# Patient Record
Sex: FEMALE | Race: WHITE | NOT HISPANIC OR LATINO
[De-identification: names, ages, dates, MRNs, and addresses within clinical notes are randomized per-mention and may not be internally consistent; named-entity substitution may affect disease eponyms.]

---

## 2017-09-19 PROBLEM — Z00.00 ENCOUNTER FOR PREVENTIVE HEALTH EXAMINATION: Status: ACTIVE | Noted: 2017-09-19

## 2020-02-13 ENCOUNTER — APPOINTMENT (OUTPATIENT)
Dept: RHEUMATOLOGY | Facility: CLINIC | Age: 75
End: 2020-02-13

## 2021-10-14 ENCOUNTER — APPOINTMENT (OUTPATIENT)
Dept: CT IMAGING | Facility: CLINIC | Age: 76
End: 2021-10-14
Payer: MEDICARE

## 2021-10-14 ENCOUNTER — OUTPATIENT (OUTPATIENT)
Dept: OUTPATIENT SERVICES | Facility: HOSPITAL | Age: 76
LOS: 1 days | End: 2021-10-14
Payer: MEDICARE

## 2021-10-14 DIAGNOSIS — R06.00 DYSPNEA, UNSPECIFIED: ICD-10-CM

## 2021-10-14 DIAGNOSIS — R00.0 TACHYCARDIA, UNSPECIFIED: ICD-10-CM

## 2021-10-14 PROCEDURE — 82565 ASSAY OF CREATININE: CPT

## 2021-10-14 PROCEDURE — 75574 CT ANGIO HRT W/3D IMAGE: CPT

## 2021-10-14 PROCEDURE — 75574 CT ANGIO HRT W/3D IMAGE: CPT | Mod: 26

## 2022-07-16 ENCOUNTER — EMERGENCY (EMERGENCY)
Facility: HOSPITAL | Age: 77
LOS: 1 days | Discharge: HOME | End: 2022-07-16
Attending: EMERGENCY MEDICINE
Payer: MEDICARE

## 2022-07-16 VITALS
RESPIRATION RATE: 16 BRPM | TEMPERATURE: 98 F | HEART RATE: 77 BPM | DIASTOLIC BLOOD PRESSURE: 85 MMHG | SYSTOLIC BLOOD PRESSURE: 166 MMHG | OXYGEN SATURATION: 96 %

## 2022-07-16 VITALS
WEIGHT: 125 LBS | DIASTOLIC BLOOD PRESSURE: 84 MMHG | HEIGHT: 64 IN | OXYGEN SATURATION: 99 % | TEMPERATURE: 99 F | SYSTOLIC BLOOD PRESSURE: 168 MMHG | RESPIRATION RATE: 17 BRPM | HEART RATE: 83 BPM

## 2022-07-16 LAB
ALBUMIN SERPL ELPH-MCNC: 3.8 G/DL — SIGNIFICANT CHANGE UP (ref 3.5–5)
ALP SERPL-CCNC: 86 U/L — SIGNIFICANT CHANGE UP (ref 40–120)
ALT FLD-CCNC: 24 U/L DA — SIGNIFICANT CHANGE UP (ref 10–60)
ANION GAP SERPL CALC-SCNC: 9 MMOL/L — SIGNIFICANT CHANGE UP (ref 5–17)
AST SERPL-CCNC: 18 U/L — SIGNIFICANT CHANGE UP (ref 10–40)
BILIRUB SERPL-MCNC: 0.6 MG/DL — SIGNIFICANT CHANGE UP (ref 0.2–1.2)
BUN SERPL-MCNC: 18 MG/DL — SIGNIFICANT CHANGE UP (ref 7–18)
CALCIUM SERPL-MCNC: 10.8 MG/DL — HIGH (ref 8.4–10.5)
CHLORIDE SERPL-SCNC: 99 MMOL/L — SIGNIFICANT CHANGE UP (ref 96–108)
CO2 SERPL-SCNC: 24 MMOL/L — SIGNIFICANT CHANGE UP (ref 22–31)
CREAT SERPL-MCNC: 0.77 MG/DL — SIGNIFICANT CHANGE UP (ref 0.5–1.3)
EGFR: 78 ML/MIN/1.73M2 — SIGNIFICANT CHANGE UP
GLUCOSE SERPL-MCNC: 101 MG/DL — HIGH (ref 70–99)
HCT VFR BLD CALC: 38.2 % — SIGNIFICANT CHANGE UP (ref 34.5–45)
HGB BLD-MCNC: 12.4 G/DL — SIGNIFICANT CHANGE UP (ref 11.5–15.5)
MCHC RBC-ENTMCNC: 27.6 PG — SIGNIFICANT CHANGE UP (ref 27–34)
MCHC RBC-ENTMCNC: 32.5 GM/DL — SIGNIFICANT CHANGE UP (ref 32–36)
MCV RBC AUTO: 85.1 FL — SIGNIFICANT CHANGE UP (ref 80–100)
NRBC # BLD: 0 /100 WBCS — SIGNIFICANT CHANGE UP (ref 0–0)
PLATELET # BLD AUTO: 379 K/UL — SIGNIFICANT CHANGE UP (ref 150–400)
POTASSIUM SERPL-MCNC: 4 MMOL/L — SIGNIFICANT CHANGE UP (ref 3.5–5.3)
POTASSIUM SERPL-SCNC: 4 MMOL/L — SIGNIFICANT CHANGE UP (ref 3.5–5.3)
PROT SERPL-MCNC: 8 G/DL — SIGNIFICANT CHANGE UP (ref 6–8.3)
RBC # BLD: 4.49 M/UL — SIGNIFICANT CHANGE UP (ref 3.8–5.2)
RBC # FLD: 12.6 % — SIGNIFICANT CHANGE UP (ref 10.3–14.5)
SODIUM SERPL-SCNC: 132 MMOL/L — LOW (ref 135–145)
WBC # BLD: 11.15 K/UL — HIGH (ref 3.8–10.5)
WBC # FLD AUTO: 11.15 K/UL — HIGH (ref 3.8–10.5)

## 2022-07-16 PROCEDURE — 85027 COMPLETE CBC AUTOMATED: CPT

## 2022-07-16 PROCEDURE — 93005 ELECTROCARDIOGRAM TRACING: CPT

## 2022-07-16 PROCEDURE — 99284 EMERGENCY DEPT VISIT MOD MDM: CPT | Mod: 25

## 2022-07-16 PROCEDURE — 96374 THER/PROPH/DIAG INJ IV PUSH: CPT

## 2022-07-16 PROCEDURE — 93010 ELECTROCARDIOGRAM REPORT: CPT

## 2022-07-16 PROCEDURE — 99284 EMERGENCY DEPT VISIT MOD MDM: CPT

## 2022-07-16 PROCEDURE — 36415 COLL VENOUS BLD VENIPUNCTURE: CPT

## 2022-07-16 PROCEDURE — 80053 COMPREHEN METABOLIC PANEL: CPT

## 2022-07-16 RX ORDER — SODIUM CHLORIDE 9 MG/ML
1000 INJECTION INTRAMUSCULAR; INTRAVENOUS; SUBCUTANEOUS ONCE
Refills: 0 | Status: COMPLETED | OUTPATIENT
Start: 2022-07-16 | End: 2022-07-16

## 2022-07-16 RX ORDER — ONDANSETRON 8 MG/1
1 TABLET, FILM COATED ORAL
Qty: 15 | Refills: 0
Start: 2022-07-16

## 2022-07-16 RX ORDER — MECLIZINE HCL 12.5 MG
1 TABLET ORAL
Qty: 15 | Refills: 0
Start: 2022-07-16

## 2022-07-16 RX ORDER — MECLIZINE HCL 12.5 MG
25 TABLET ORAL ONCE
Refills: 0 | Status: COMPLETED | OUTPATIENT
Start: 2022-07-16 | End: 2022-07-16

## 2022-07-16 RX ORDER — ONDANSETRON 8 MG/1
4 TABLET, FILM COATED ORAL ONCE
Refills: 0 | Status: COMPLETED | OUTPATIENT
Start: 2022-07-16 | End: 2022-07-16

## 2022-07-16 RX ADMIN — Medication 25 MILLIGRAM(S): at 16:42

## 2022-07-16 RX ADMIN — ONDANSETRON 4 MILLIGRAM(S): 8 TABLET, FILM COATED ORAL at 16:42

## 2022-07-16 RX ADMIN — SODIUM CHLORIDE 1000 MILLILITER(S): 9 INJECTION INTRAMUSCULAR; INTRAVENOUS; SUBCUTANEOUS at 16:00

## 2022-07-16 NOTE — ED PROVIDER NOTE - PATIENT PORTAL LINK FT
You can access the FollowMyHealth Patient Portal offered by [facility] by registering at the following website: [email or web address]. By joining [employer]’s FollowMyHealth portal, you will also be able to view your health information using other applications (apps) compatible with our system.

## 2022-07-16 NOTE — ED PROVIDER NOTE - NSFOLLOWUPCLINICS_GEN_ALL_ED_FT
[facility]  Neurology  [address] [place] [address]  Phone: [phone]  Fax: [phone]

## 2022-07-16 NOTE — ED PROVIDER NOTE - PROGRESS NOTE DETAILS
EKG - nsr, rate 68, Qtc 433, no ischemic changes labs - WBC 11  Feeling better  Ambulating  Repeat neuro exam wnl  Dc with supportive care and neuro fu  Discussed indications for patient return to ED. Patient understood.

## 2022-07-16 NOTE — ED PROVIDER NOTE - OBJECTIVE STATEMENT
78 yo F pmh of giant cell arteritis, HTN, presents with dizziness since yesterday, feels like room spinning, worse with head movements. Denies other acute complaints.

## 2022-07-16 NOTE — ED ADULT NURSE NOTE - PLAN OF CARE
Patient is Scheduled with DR. POOL on 9/9/21.    No Further Questions, Confirmation Letter Sent.  Thank you!    NEW PATIENT QUESTIONS     Who referred you to our clinic? SELF-REFERRED    What are your primary symptoms you would like to be addressed at your visit? ALLERGIES    Have you seen an ALLERGIST: Yes     If yes who did you see? Currently sees provider through [facility] in [place], looking at establishing with Dr. Pool     Have you seen DERMATOLOGY?: No     If yes who did you see?      Have you seen PULMONOLOGY?: No     If yes who did you see?      Did you have any breathing tests (where you sit in a phone shields type box or breathe into a machine in the office) ? No     Have you seen gastroenterologist ?: No     If yes who did you see?      Have you seen ENT?: No     If so who did you see?      Have you been seen in the Emergency Department/ Urgent care for this issue? No    Any outside provider listed above should be faxed a request for records unless records are in Care Everywhere. Were records requested?  No         Call bell/Explanation of exam/test

## 2022-08-12 NOTE — ED PROVIDER NOTE - CHIEF COMPLAINT
----- Message from Sarah Correa sent at 7/29/2022  8:49 AM EDT -----  Subject: Hospital Follow Up    QUESTIONS  What hospital was the Patient Discharged from? [facility], [facility].  Date of Discharge? 2022-07-28  Discharge Location? Home  Reason for hospitalization as patient stated? dizziness, weakness,   lightheaded, diagnosed with UTI. What question does the patient have, if applicable? None. Please call   vivienne Cain to schedule.   ---------------------------------------------------------------------------  --------------  CALL BACK INFO  What is the best way for the office to contact you? OK to leave message on   voicemail  Preferred Call Back Phone Number? [phone]  ---------------------------------------------------------------------------  --------------  SCRIPT ANSWERS  Relationship to Patient? Other  Representative Name? Payton  Additional information verified (besides Name and Date of Birth)? Address  (Patient requests to see provider urgently. )? No  (Has the patient been discharged from the hospital within 2 business days   AND does not have a Telephone Encounter  Follow Up From [address] [place] [address]   documented in [address]?)? Yes  (Patient needs follow up visit after hospital discharge) Book first   available appointment within 7 days OF DISCHARGE, if no appt, proceed to   book the next available time slot within 14 days OF DISCHARGE AND Send   Message to Provider. [address] [facility] Follow Up appointment cannot be booked   beyond 14 Days and should result in a Message to Provider. ?  Yes The patient is a 77y Female complaining of dizziness.

## 2023-10-25 ENCOUNTER — TRANSCRIPTION ENCOUNTER (OUTPATIENT)
Age: 78
End: 2023-10-25

## 2023-10-25 ENCOUNTER — INPATIENT (INPATIENT)
Facility: HOSPITAL | Age: 78
LOS: 0 days | Discharge: HOME HEALTH | DRG: 563 | End: 2023-10-26
Attending: ORTHOPAEDIC SURGERY | Admitting: ORTHOPAEDIC SURGERY
Payer: MEDICARE

## 2023-10-25 VITALS
TEMPERATURE: 98 F | SYSTOLIC BLOOD PRESSURE: 177 MMHG | WEIGHT: 123.9 LBS | RESPIRATION RATE: 18 BRPM | HEART RATE: 85 BPM | HEIGHT: 63 IN | DIASTOLIC BLOOD PRESSURE: 81 MMHG | OXYGEN SATURATION: 97 %

## 2023-10-25 LAB
ALBUMIN SERPL ELPH-MCNC: 3.9 G/DL — SIGNIFICANT CHANGE UP (ref 3.5–5)
ALBUMIN SERPL ELPH-MCNC: 3.9 G/DL — SIGNIFICANT CHANGE UP (ref 3.5–5)
ALP SERPL-CCNC: 70 U/L — SIGNIFICANT CHANGE UP (ref 40–120)
ALP SERPL-CCNC: 70 U/L — SIGNIFICANT CHANGE UP (ref 40–120)
ALT FLD-CCNC: 37 U/L DA — SIGNIFICANT CHANGE UP (ref 10–60)
ALT FLD-CCNC: 37 U/L DA — SIGNIFICANT CHANGE UP (ref 10–60)
ANION GAP SERPL CALC-SCNC: 5 MMOL/L — SIGNIFICANT CHANGE UP (ref 5–17)
ANION GAP SERPL CALC-SCNC: 5 MMOL/L — SIGNIFICANT CHANGE UP (ref 5–17)
APTT BLD: 31 SEC — SIGNIFICANT CHANGE UP (ref 24.5–35.6)
APTT BLD: 31 SEC — SIGNIFICANT CHANGE UP (ref 24.5–35.6)
AST SERPL-CCNC: 30 U/L — SIGNIFICANT CHANGE UP (ref 10–40)
AST SERPL-CCNC: 30 U/L — SIGNIFICANT CHANGE UP (ref 10–40)
BASOPHILS # BLD AUTO: 0.03 K/UL — SIGNIFICANT CHANGE UP (ref 0–0.2)
BASOPHILS # BLD AUTO: 0.03 K/UL — SIGNIFICANT CHANGE UP (ref 0–0.2)
BASOPHILS NFR BLD AUTO: 0.2 % — SIGNIFICANT CHANGE UP (ref 0–2)
BASOPHILS NFR BLD AUTO: 0.2 % — SIGNIFICANT CHANGE UP (ref 0–2)
BILIRUB SERPL-MCNC: 0.4 MG/DL — SIGNIFICANT CHANGE UP (ref 0.2–1.2)
BILIRUB SERPL-MCNC: 0.4 MG/DL — SIGNIFICANT CHANGE UP (ref 0.2–1.2)
BLD GP AB SCN SERPL QL: SIGNIFICANT CHANGE UP
BLD GP AB SCN SERPL QL: SIGNIFICANT CHANGE UP
BUN SERPL-MCNC: 23 MG/DL — HIGH (ref 7–18)
BUN SERPL-MCNC: 23 MG/DL — HIGH (ref 7–18)
CALCIUM SERPL-MCNC: 9.7 MG/DL — SIGNIFICANT CHANGE UP (ref 8.4–10.5)
CALCIUM SERPL-MCNC: 9.7 MG/DL — SIGNIFICANT CHANGE UP (ref 8.4–10.5)
CHLORIDE SERPL-SCNC: 101 MMOL/L — SIGNIFICANT CHANGE UP (ref 96–108)
CHLORIDE SERPL-SCNC: 101 MMOL/L — SIGNIFICANT CHANGE UP (ref 96–108)
CO2 SERPL-SCNC: 28 MMOL/L — SIGNIFICANT CHANGE UP (ref 22–31)
CO2 SERPL-SCNC: 28 MMOL/L — SIGNIFICANT CHANGE UP (ref 22–31)
CREAT SERPL-MCNC: 1.04 MG/DL — SIGNIFICANT CHANGE UP (ref 0.5–1.3)
CREAT SERPL-MCNC: 1.04 MG/DL — SIGNIFICANT CHANGE UP (ref 0.5–1.3)
EGFR: 54 ML/MIN/1.73M2 — LOW
EGFR: 54 ML/MIN/1.73M2 — LOW
EOSINOPHIL # BLD AUTO: 0 K/UL — SIGNIFICANT CHANGE UP (ref 0–0.5)
EOSINOPHIL # BLD AUTO: 0 K/UL — SIGNIFICANT CHANGE UP (ref 0–0.5)
EOSINOPHIL NFR BLD AUTO: 0 % — SIGNIFICANT CHANGE UP (ref 0–6)
EOSINOPHIL NFR BLD AUTO: 0 % — SIGNIFICANT CHANGE UP (ref 0–6)
GLUCOSE SERPL-MCNC: 130 MG/DL — HIGH (ref 70–99)
GLUCOSE SERPL-MCNC: 130 MG/DL — HIGH (ref 70–99)
HCT VFR BLD CALC: 34.7 % — SIGNIFICANT CHANGE UP (ref 34.5–45)
HCT VFR BLD CALC: 34.7 % — SIGNIFICANT CHANGE UP (ref 34.5–45)
HGB BLD-MCNC: 11.1 G/DL — LOW (ref 11.5–15.5)
HGB BLD-MCNC: 11.1 G/DL — LOW (ref 11.5–15.5)
IMM GRANULOCYTES NFR BLD AUTO: 0.4 % — SIGNIFICANT CHANGE UP (ref 0–0.9)
IMM GRANULOCYTES NFR BLD AUTO: 0.4 % — SIGNIFICANT CHANGE UP (ref 0–0.9)
INR BLD: 1.03 RATIO — SIGNIFICANT CHANGE UP (ref 0.85–1.18)
INR BLD: 1.03 RATIO — SIGNIFICANT CHANGE UP (ref 0.85–1.18)
LYMPHOCYTES # BLD AUTO: 1.07 K/UL — SIGNIFICANT CHANGE UP (ref 1–3.3)
LYMPHOCYTES # BLD AUTO: 1.07 K/UL — SIGNIFICANT CHANGE UP (ref 1–3.3)
LYMPHOCYTES # BLD AUTO: 6.8 % — LOW (ref 13–44)
LYMPHOCYTES # BLD AUTO: 6.8 % — LOW (ref 13–44)
MCHC RBC-ENTMCNC: 27.5 PG — SIGNIFICANT CHANGE UP (ref 27–34)
MCHC RBC-ENTMCNC: 27.5 PG — SIGNIFICANT CHANGE UP (ref 27–34)
MCHC RBC-ENTMCNC: 32 GM/DL — SIGNIFICANT CHANGE UP (ref 32–36)
MCHC RBC-ENTMCNC: 32 GM/DL — SIGNIFICANT CHANGE UP (ref 32–36)
MCV RBC AUTO: 86.1 FL — SIGNIFICANT CHANGE UP (ref 80–100)
MCV RBC AUTO: 86.1 FL — SIGNIFICANT CHANGE UP (ref 80–100)
MONOCYTES # BLD AUTO: 0.69 K/UL — SIGNIFICANT CHANGE UP (ref 0–0.9)
MONOCYTES # BLD AUTO: 0.69 K/UL — SIGNIFICANT CHANGE UP (ref 0–0.9)
MONOCYTES NFR BLD AUTO: 4.4 % — SIGNIFICANT CHANGE UP (ref 2–14)
MONOCYTES NFR BLD AUTO: 4.4 % — SIGNIFICANT CHANGE UP (ref 2–14)
NEUTROPHILS # BLD AUTO: 13.99 K/UL — HIGH (ref 1.8–7.4)
NEUTROPHILS # BLD AUTO: 13.99 K/UL — HIGH (ref 1.8–7.4)
NEUTROPHILS NFR BLD AUTO: 88.2 % — HIGH (ref 43–77)
NEUTROPHILS NFR BLD AUTO: 88.2 % — HIGH (ref 43–77)
NRBC # BLD: 0 /100 WBCS — SIGNIFICANT CHANGE UP (ref 0–0)
NRBC # BLD: 0 /100 WBCS — SIGNIFICANT CHANGE UP (ref 0–0)
PLATELET # BLD AUTO: 284 K/UL — SIGNIFICANT CHANGE UP (ref 150–400)
PLATELET # BLD AUTO: 284 K/UL — SIGNIFICANT CHANGE UP (ref 150–400)
POTASSIUM SERPL-MCNC: 4.3 MMOL/L — SIGNIFICANT CHANGE UP (ref 3.5–5.3)
POTASSIUM SERPL-MCNC: 4.3 MMOL/L — SIGNIFICANT CHANGE UP (ref 3.5–5.3)
POTASSIUM SERPL-SCNC: 4.3 MMOL/L — SIGNIFICANT CHANGE UP (ref 3.5–5.3)
POTASSIUM SERPL-SCNC: 4.3 MMOL/L — SIGNIFICANT CHANGE UP (ref 3.5–5.3)
PROT SERPL-MCNC: 7.7 G/DL — SIGNIFICANT CHANGE UP (ref 6–8.3)
PROT SERPL-MCNC: 7.7 G/DL — SIGNIFICANT CHANGE UP (ref 6–8.3)
PROTHROM AB SERPL-ACNC: 11.7 SEC — SIGNIFICANT CHANGE UP (ref 9.5–13)
PROTHROM AB SERPL-ACNC: 11.7 SEC — SIGNIFICANT CHANGE UP (ref 9.5–13)
RBC # BLD: 4.03 M/UL — SIGNIFICANT CHANGE UP (ref 3.8–5.2)
RBC # BLD: 4.03 M/UL — SIGNIFICANT CHANGE UP (ref 3.8–5.2)
RBC # FLD: 14 % — SIGNIFICANT CHANGE UP (ref 10.3–14.5)
RBC # FLD: 14 % — SIGNIFICANT CHANGE UP (ref 10.3–14.5)
SODIUM SERPL-SCNC: 134 MMOL/L — LOW (ref 135–145)
SODIUM SERPL-SCNC: 134 MMOL/L — LOW (ref 135–145)
WBC # BLD: 15.84 K/UL — HIGH (ref 3.8–10.5)
WBC # BLD: 15.84 K/UL — HIGH (ref 3.8–10.5)
WBC # FLD AUTO: 15.84 K/UL — HIGH (ref 3.8–10.5)
WBC # FLD AUTO: 15.84 K/UL — HIGH (ref 3.8–10.5)

## 2023-10-25 PROCEDURE — 71101 X-RAY EXAM UNILAT RIBS/CHEST: CPT | Mod: 26

## 2023-10-25 PROCEDURE — 73030 X-RAY EXAM OF SHOULDER: CPT | Mod: 26,RT

## 2023-10-25 PROCEDURE — 73060 X-RAY EXAM OF HUMERUS: CPT | Mod: 26,RT

## 2023-10-25 PROCEDURE — 99285 EMERGENCY DEPT VISIT HI MDM: CPT

## 2023-10-25 PROCEDURE — 73030 X-RAY EXAM OF SHOULDER: CPT | Mod: 26,RT,77

## 2023-10-25 PROCEDURE — 73080 X-RAY EXAM OF ELBOW: CPT | Mod: 26,RT

## 2023-10-25 RX ORDER — LIDOCAINE HCL 20 MG/ML
10 VIAL (ML) INJECTION ONCE
Refills: 0 | Status: COMPLETED | OUTPATIENT
Start: 2023-10-25 | End: 2023-10-25

## 2023-10-25 RX ORDER — MORPHINE SULFATE 50 MG/1
2 CAPSULE, EXTENDED RELEASE ORAL ONCE
Refills: 0 | Status: DISCONTINUED | OUTPATIENT
Start: 2023-10-25 | End: 2023-10-25

## 2023-10-25 RX ORDER — SODIUM CHLORIDE 9 MG/ML
500 INJECTION INTRAMUSCULAR; INTRAVENOUS; SUBCUTANEOUS ONCE
Refills: 0 | Status: COMPLETED | OUTPATIENT
Start: 2023-10-25 | End: 2023-10-25

## 2023-10-25 RX ORDER — KETOROLAC TROMETHAMINE 30 MG/ML
15 SYRINGE (ML) INJECTION ONCE
Refills: 0 | Status: DISCONTINUED | OUTPATIENT
Start: 2023-10-25 | End: 2023-10-25

## 2023-10-25 RX ORDER — ONDANSETRON 8 MG/1
4 TABLET, FILM COATED ORAL ONCE
Refills: 0 | Status: COMPLETED | OUTPATIENT
Start: 2023-10-25 | End: 2023-10-25

## 2023-10-25 RX ADMIN — SODIUM CHLORIDE 500 MILLILITER(S): 9 INJECTION INTRAMUSCULAR; INTRAVENOUS; SUBCUTANEOUS at 23:41

## 2023-10-25 RX ADMIN — ONDANSETRON 4 MILLIGRAM(S): 8 TABLET, FILM COATED ORAL at 23:41

## 2023-10-25 RX ADMIN — MORPHINE SULFATE 2 MILLIGRAM(S): 50 CAPSULE, EXTENDED RELEASE ORAL at 22:41

## 2023-10-25 RX ADMIN — Medication 15 MILLIGRAM(S): at 20:10

## 2023-10-25 RX ADMIN — Medication 10 MILLILITER(S): at 23:25

## 2023-10-25 RX ADMIN — Medication 15 MILLIGRAM(S): at 22:44

## 2023-10-25 NOTE — ED PROVIDER NOTE - CARE PLAN
Principal Discharge DX:	Dislocation of right shoulder joint  Secondary Diagnosis:	Fracture of humerus greater tuberosity   1

## 2023-10-25 NOTE — ED PROVIDER NOTE - PHYSICAL EXAMINATION
Gen:  Awake, alert, NAD, WDWN, NCAT, non-toxic appearing. No skull/facial tender, no step-offs, no raccoon/spain.  Eyes:  PERRL, EOMI, no icterus, normal lids/lashes, normal conjunctivae.  ENT:  External inspection normal, pink/moist membranes. No dental pain/tender, no loose teeth, no malocclusion, no bite deformity, no septal hematoma, no sinus/tmj/dental/temporal/mastoid tender.  CV:  S1S2, regular rate and rhythm, no murmur/gallops/rubs, no JVD, 2+ pulses b/l, no edema/cords/homans, warm/well-perfused.  Resp:  Normal respiratory rate/effort, no respiratory distress, normal voice, speaking full sentences, lungs clear to auscultation b/l, no wheezing/rales/rhonchi, no retractions, no stridor.  Abd:  Soft abdomen, no tender/distended/guarding/rebound, no pulsatile mass, no CVA tender.   Musculoskeletal:  N/V intact, FROM all 4 extremities, normal motor tone, stable gait. Pelvis stable, no TLS spinal tender/deformity/step-offs, no darby tender/deformity. R shoulder/proximal humerus difficulty w ROM due to pain, no effusion/swelling, no deformity, +diffuse tender. R elbow/forearm FROM, stable joints, no effusion/swelling, no darby tender/deformity. Chest wall no tender, deformity, flail, or crepitus. R knee:  FROM/full extension, neg ant/post drawer, neg varus/valgus tender, neg grind, no effusion/swelling, no adrby tender/deformity.   Neck:  FROM neck, supple, trachea midline, no meningismus. No C-spine tender/deformity/step-offs.   Skin:  Color normal for race, warm and dry, no rash. R knee superficial abrasion w no evidence of infection  Neuro:  Oriented x3, CN 2-12 intact (grossly), normal motor (grossly), normal sensory (grossly), normal gait. GCS 15  Psych:  Attention normal. Affect normal. Behavior normal. Judgment normal.

## 2023-10-25 NOTE — ED PROVIDER NOTE - OBJECTIVE STATEMENT
78 female with hx of HTN, HLD.   Pt presenting to the ED reporting mechanical slip and fall with injury to right shoulder & chest wall.  No head/neck trauma, no LOC, no blood thinner use.  Patient usual state of health prior, no other injuries or complaints.

## 2023-10-25 NOTE — ED PROVIDER NOTE - NSFOLLOWUPINSTRUCTIONS_ED_ALL_ED_FT
Lab values demonstrate no acute/emergent pathology.    Independent interpretation of the EKG: Sinus @ ***, normal axis, normal intervals, normal ST/T  Independent interpretation of XR: ***    ***    Pt advised regarding need for close outpatient follow up.  Patient stable for further care in outpatient setting. No indication for inpatient admission at this time. Patient advised regarding symptomatic & supportive care and symptoms to prompt ED return. Strict return precautions provided.  ***  Patient requires inpatient admission for further care & stabilization. Care signed out to inpatient team.

## 2023-10-25 NOTE — ED ADULT NURSE NOTE - NSFALLRISKINTERV_ED_ALL_ED

## 2023-10-25 NOTE — ED PROVIDER NOTE - NS ED ROS FT
Constitutional: (-) fever (-) chills  HENT: (-) congestion (-) rhinorrhea (-) sore throat  Eyes: (-) pain (-) redness  Respiratory: (-) cough (-) shortness of breath (-) wheezing (-) stridor    Cardiovascular: (-) palpitations (-) leg swelling  Gastrointestinal: (-) abdominal pain (-) blood in stool (no melena/hematochezia) (-) diarrhea (-) vomiting  Genitourinary: (-) dysuria (-) hematuria  Musculoskeletal: (-) gait problem (+) R shoulder pain  Skin: (-) color change (-) rash  Neurological: (-) weakness (-) numbness (-) headaches  Psychiatric/Behavioral: (-) confusion

## 2023-10-25 NOTE — ED PROVIDER NOTE - WR ORDER ID 3
Detail Level: Zone
Plan: Will resume Soolantra through [facility]
Continue Regimen: Soolantra 1% cream daily
[id number]

## 2023-10-25 NOTE — ED PROVIDER NOTE - PROGRESS NOTE DETAILS
Berenice Martinez
Attempted reduction w intra-articular lidocaine unsuccessful.  Conscious sedation performed for R shoulder reduction. Unable to reduce extremity.   Care d/w PA Neri of Orthopedics and Pt to be admitted for OR reduction  Care d/w Dr Leiva for medical clearance.    Pt currently awake/alert and talking. Notified of need for admit and comfortable w plan.

## 2023-10-25 NOTE — ED PROVIDER NOTE - CLINICAL SUMMARY MEDICAL DECISION MAKING FREE TEXT BOX
78 female with HTN & HLD presenting to the ED with mechanical fall with right shoulder & chest wall injury.    Vitals with elevated BP    Nontoxic appearing, n/v intact.  Airway intact, no respiratory distress, no hypoxia.  No abdominal or CVA tenderness.  Nonfocal neuro.  No head/neck trauma, no LOC, no blood thinner use.    Plan to obtain:    -XR R/O fracture, analgesia as needed, observe/reassess      No trauma. No blood thinner use. No new exercise/strain.    No recent travel, hospitalization, or immobilization. No recent abx or sick contacts. No new or suspected bad foods. No new medications, shampoo, detergent, or creams. No known bug bites. No new exposures. No new herbals or supplements. 78 female with HTN & HLD presenting to the ED with mechanical fall with right shoulder & chest wall injury.    Vitals with elevated BP    Nontoxic appearing, n/v intact.  Airway intact, no respiratory distress, no hypoxia.  No abdominal or CVA tenderness.  Nonfocal neuro.  No head/neck trauma, no LOC, no blood thinner use.    Plan to obtain:    -XR R/O fracture, analgesia as needed, observe/reassess    Lab values demonstrate no acute/emergent pathology.  Independent interpretation of RUE XR: anterior shoulder dislocation w fx of greater tuberosity.  Independent interpretation of CXR: No consolidation/effusion/pntx, no rib fx.  Repeat XR after initial attempted reduction w intra-articular lidocaine w unchanged reduction    Patient requires inpatient admission for further care & stabilization. Care signed out to inpatient team.

## 2023-10-26 ENCOUNTER — TRANSCRIPTION ENCOUNTER (OUTPATIENT)
Age: 78
End: 2023-10-26

## 2023-10-26 VITALS
SYSTOLIC BLOOD PRESSURE: 132 MMHG | HEART RATE: 79 BPM | RESPIRATION RATE: 16 BRPM | DIASTOLIC BLOOD PRESSURE: 71 MMHG | TEMPERATURE: 98 F | OXYGEN SATURATION: 97 %

## 2023-10-26 DIAGNOSIS — D72.829 ELEVATED WHITE BLOOD CELL COUNT, UNSPECIFIED: ICD-10-CM

## 2023-10-26 DIAGNOSIS — I10 ESSENTIAL (PRIMARY) HYPERTENSION: ICD-10-CM

## 2023-10-26 DIAGNOSIS — E78.5 HYPERLIPIDEMIA, UNSPECIFIED: ICD-10-CM

## 2023-10-26 DIAGNOSIS — Z29.9 ENCOUNTER FOR PROPHYLACTIC MEASURES, UNSPECIFIED: ICD-10-CM

## 2023-10-26 DIAGNOSIS — S43.004A UNSPECIFIED DISLOCATION OF RIGHT SHOULDER JOINT, INITIAL ENCOUNTER: ICD-10-CM

## 2023-10-26 DIAGNOSIS — Z86.79 PERSONAL HISTORY OF OTHER DISEASES OF THE CIRCULATORY SYSTEM: ICD-10-CM

## 2023-10-26 DIAGNOSIS — S43.014A ANTERIOR DISLOCATION OF RIGHT HUMERUS, INITIAL ENCOUNTER: ICD-10-CM

## 2023-10-26 LAB
ALBUMIN SERPL ELPH-MCNC: 3.4 G/DL — LOW (ref 3.5–5)
ALBUMIN SERPL ELPH-MCNC: 3.4 G/DL — LOW (ref 3.5–5)
ALP SERPL-CCNC: 54 U/L — SIGNIFICANT CHANGE UP (ref 40–120)
ALP SERPL-CCNC: 54 U/L — SIGNIFICANT CHANGE UP (ref 40–120)
ALT FLD-CCNC: 37 U/L DA — SIGNIFICANT CHANGE UP (ref 10–60)
ALT FLD-CCNC: 37 U/L DA — SIGNIFICANT CHANGE UP (ref 10–60)
ANION GAP SERPL CALC-SCNC: 4 MMOL/L — LOW (ref 5–17)
ANION GAP SERPL CALC-SCNC: 4 MMOL/L — LOW (ref 5–17)
AST SERPL-CCNC: 36 U/L — SIGNIFICANT CHANGE UP (ref 10–40)
AST SERPL-CCNC: 36 U/L — SIGNIFICANT CHANGE UP (ref 10–40)
BILIRUB SERPL-MCNC: 0.5 MG/DL — SIGNIFICANT CHANGE UP (ref 0.2–1.2)
BILIRUB SERPL-MCNC: 0.5 MG/DL — SIGNIFICANT CHANGE UP (ref 0.2–1.2)
BUN SERPL-MCNC: 16 MG/DL — SIGNIFICANT CHANGE UP (ref 7–18)
BUN SERPL-MCNC: 16 MG/DL — SIGNIFICANT CHANGE UP (ref 7–18)
CALCIUM SERPL-MCNC: 9.6 MG/DL — SIGNIFICANT CHANGE UP (ref 8.4–10.5)
CALCIUM SERPL-MCNC: 9.6 MG/DL — SIGNIFICANT CHANGE UP (ref 8.4–10.5)
CHLORIDE SERPL-SCNC: 103 MMOL/L — SIGNIFICANT CHANGE UP (ref 96–108)
CHLORIDE SERPL-SCNC: 103 MMOL/L — SIGNIFICANT CHANGE UP (ref 96–108)
CO2 SERPL-SCNC: 25 MMOL/L — SIGNIFICANT CHANGE UP (ref 22–31)
CO2 SERPL-SCNC: 25 MMOL/L — SIGNIFICANT CHANGE UP (ref 22–31)
CREAT SERPL-MCNC: 0.83 MG/DL — SIGNIFICANT CHANGE UP (ref 0.5–1.3)
CREAT SERPL-MCNC: 0.83 MG/DL — SIGNIFICANT CHANGE UP (ref 0.5–1.3)
EGFR: 71 ML/MIN/1.73M2 — SIGNIFICANT CHANGE UP
EGFR: 71 ML/MIN/1.73M2 — SIGNIFICANT CHANGE UP
GLUCOSE SERPL-MCNC: 127 MG/DL — HIGH (ref 70–99)
GLUCOSE SERPL-MCNC: 127 MG/DL — HIGH (ref 70–99)
HCT VFR BLD CALC: 33.1 % — LOW (ref 34.5–45)
HCT VFR BLD CALC: 33.1 % — LOW (ref 34.5–45)
HGB BLD-MCNC: 10.6 G/DL — LOW (ref 11.5–15.5)
HGB BLD-MCNC: 10.6 G/DL — LOW (ref 11.5–15.5)
MAGNESIUM SERPL-MCNC: 2.1 MG/DL — SIGNIFICANT CHANGE UP (ref 1.6–2.6)
MAGNESIUM SERPL-MCNC: 2.1 MG/DL — SIGNIFICANT CHANGE UP (ref 1.6–2.6)
MCHC RBC-ENTMCNC: 27.7 PG — SIGNIFICANT CHANGE UP (ref 27–34)
MCHC RBC-ENTMCNC: 27.7 PG — SIGNIFICANT CHANGE UP (ref 27–34)
MCHC RBC-ENTMCNC: 32 GM/DL — SIGNIFICANT CHANGE UP (ref 32–36)
MCHC RBC-ENTMCNC: 32 GM/DL — SIGNIFICANT CHANGE UP (ref 32–36)
MCV RBC AUTO: 86.6 FL — SIGNIFICANT CHANGE UP (ref 80–100)
MCV RBC AUTO: 86.6 FL — SIGNIFICANT CHANGE UP (ref 80–100)
NRBC # BLD: 0 /100 WBCS — SIGNIFICANT CHANGE UP (ref 0–0)
NRBC # BLD: 0 /100 WBCS — SIGNIFICANT CHANGE UP (ref 0–0)
PHOSPHATE SERPL-MCNC: 3.5 MG/DL — SIGNIFICANT CHANGE UP (ref 2.5–4.5)
PHOSPHATE SERPL-MCNC: 3.5 MG/DL — SIGNIFICANT CHANGE UP (ref 2.5–4.5)
PLATELET # BLD AUTO: 287 K/UL — SIGNIFICANT CHANGE UP (ref 150–400)
PLATELET # BLD AUTO: 287 K/UL — SIGNIFICANT CHANGE UP (ref 150–400)
POTASSIUM SERPL-MCNC: 4.3 MMOL/L — SIGNIFICANT CHANGE UP (ref 3.5–5.3)
POTASSIUM SERPL-MCNC: 4.3 MMOL/L — SIGNIFICANT CHANGE UP (ref 3.5–5.3)
POTASSIUM SERPL-SCNC: 4.3 MMOL/L — SIGNIFICANT CHANGE UP (ref 3.5–5.3)
POTASSIUM SERPL-SCNC: 4.3 MMOL/L — SIGNIFICANT CHANGE UP (ref 3.5–5.3)
PROT SERPL-MCNC: 7.1 G/DL — SIGNIFICANT CHANGE UP (ref 6–8.3)
PROT SERPL-MCNC: 7.1 G/DL — SIGNIFICANT CHANGE UP (ref 6–8.3)
RBC # BLD: 3.82 M/UL — SIGNIFICANT CHANGE UP (ref 3.8–5.2)
RBC # BLD: 3.82 M/UL — SIGNIFICANT CHANGE UP (ref 3.8–5.2)
RBC # FLD: 14.1 % — SIGNIFICANT CHANGE UP (ref 10.3–14.5)
RBC # FLD: 14.1 % — SIGNIFICANT CHANGE UP (ref 10.3–14.5)
SODIUM SERPL-SCNC: 132 MMOL/L — LOW (ref 135–145)
SODIUM SERPL-SCNC: 132 MMOL/L — LOW (ref 135–145)
WBC # BLD: 14.68 K/UL — HIGH (ref 3.8–10.5)
WBC # BLD: 14.68 K/UL — HIGH (ref 3.8–10.5)
WBC # FLD AUTO: 14.68 K/UL — HIGH (ref 3.8–10.5)
WBC # FLD AUTO: 14.68 K/UL — HIGH (ref 3.8–10.5)

## 2023-10-26 PROCEDURE — 76000 FLUOROSCOPY <1 HR PHYS/QHP: CPT

## 2023-10-26 PROCEDURE — 71045 X-RAY EXAM CHEST 1 VIEW: CPT | Mod: 26

## 2023-10-26 PROCEDURE — 71045 X-RAY EXAM CHEST 1 VIEW: CPT

## 2023-10-26 PROCEDURE — 36415 COLL VENOUS BLD VENIPUNCTURE: CPT

## 2023-10-26 PROCEDURE — 80053 COMPREHEN METABOLIC PANEL: CPT

## 2023-10-26 PROCEDURE — 86850 RBC ANTIBODY SCREEN: CPT

## 2023-10-26 PROCEDURE — 73060 X-RAY EXAM OF HUMERUS: CPT

## 2023-10-26 PROCEDURE — 84100 ASSAY OF PHOSPHORUS: CPT

## 2023-10-26 PROCEDURE — 73030 X-RAY EXAM OF SHOULDER: CPT | Mod: 26,RT

## 2023-10-26 PROCEDURE — 85027 COMPLETE CBC AUTOMATED: CPT

## 2023-10-26 PROCEDURE — 99222 1ST HOSP IP/OBS MODERATE 55: CPT

## 2023-10-26 PROCEDURE — 93306 TTE W/DOPPLER COMPLETE: CPT

## 2023-10-26 PROCEDURE — 85025 COMPLETE CBC W/AUTO DIFF WBC: CPT

## 2023-10-26 PROCEDURE — 73030 X-RAY EXAM OF SHOULDER: CPT

## 2023-10-26 PROCEDURE — 99285 EMERGENCY DEPT VISIT HI MDM: CPT

## 2023-10-26 PROCEDURE — 85610 PROTHROMBIN TIME: CPT

## 2023-10-26 PROCEDURE — 86900 BLOOD TYPING SEROLOGIC ABO: CPT

## 2023-10-26 PROCEDURE — 82962 GLUCOSE BLOOD TEST: CPT

## 2023-10-26 PROCEDURE — 86901 BLOOD TYPING SEROLOGIC RH(D): CPT

## 2023-10-26 PROCEDURE — 71101 X-RAY EXAM UNILAT RIBS/CHEST: CPT

## 2023-10-26 PROCEDURE — 83735 ASSAY OF MAGNESIUM: CPT

## 2023-10-26 PROCEDURE — 73080 X-RAY EXAM OF ELBOW: CPT

## 2023-10-26 PROCEDURE — 85730 THROMBOPLASTIN TIME PARTIAL: CPT

## 2023-10-26 RX ORDER — ACETAMINOPHEN 500 MG
650 TABLET ORAL EVERY 6 HOURS
Refills: 0 | Status: DISCONTINUED | OUTPATIENT
Start: 2023-10-26 | End: 2023-10-26

## 2023-10-26 RX ORDER — SODIUM CHLORIDE 9 MG/ML
1000 INJECTION, SOLUTION INTRAVENOUS
Refills: 0 | Status: DISCONTINUED | OUTPATIENT
Start: 2023-10-26 | End: 2023-10-26

## 2023-10-26 RX ORDER — IBUPROFEN 200 MG
1 TABLET ORAL
Qty: 28 | Refills: 0
Start: 2023-10-26 | End: 2023-11-01

## 2023-10-26 RX ORDER — METOPROLOL TARTRATE 50 MG
25 TABLET ORAL
Refills: 0 | Status: DISCONTINUED | OUTPATIENT
Start: 2023-10-26 | End: 2023-10-26

## 2023-10-26 RX ORDER — SODIUM CHLORIDE 9 MG/ML
1000 INJECTION INTRAMUSCULAR; INTRAVENOUS; SUBCUTANEOUS ONCE
Refills: 0 | Status: COMPLETED | OUTPATIENT
Start: 2023-10-26 | End: 2023-10-26

## 2023-10-26 RX ORDER — AMLODIPINE BESYLATE 2.5 MG/1
2.5 TABLET ORAL DAILY
Refills: 0 | Status: DISCONTINUED | OUTPATIENT
Start: 2023-10-26 | End: 2023-10-26

## 2023-10-26 RX ORDER — OXYCODONE AND ACETAMINOPHEN 5; 325 MG/1; MG/1
1 TABLET ORAL
Qty: 28 | Refills: 0
Start: 2023-10-26 | End: 2023-11-01

## 2023-10-26 RX ORDER — METOPROLOL TARTRATE 50 MG
1 TABLET ORAL
Refills: 0 | DISCHARGE

## 2023-10-26 RX ORDER — FENTANYL CITRATE 50 UG/ML
25 INJECTION INTRAVENOUS
Refills: 0 | Status: DISCONTINUED | OUTPATIENT
Start: 2023-10-26 | End: 2023-10-26

## 2023-10-26 RX ORDER — ONDANSETRON 8 MG/1
4 TABLET, FILM COATED ORAL EVERY 6 HOURS
Refills: 0 | Status: DISCONTINUED | OUTPATIENT
Start: 2023-10-26 | End: 2023-10-26

## 2023-10-26 RX ORDER — SENNA PLUS 8.6 MG/1
2 TABLET ORAL
Qty: 14 | Refills: 0
Start: 2023-10-26 | End: 2023-11-01

## 2023-10-26 RX ORDER — PROPOFOL 10 MG/ML
50 INJECTION, EMULSION INTRAVENOUS ONCE
Refills: 0 | Status: COMPLETED | OUTPATIENT
Start: 2023-10-26 | End: 2023-10-26

## 2023-10-26 RX ORDER — AMLODIPINE BESYLATE 2.5 MG/1
1 TABLET ORAL
Refills: 0 | DISCHARGE

## 2023-10-26 RX ORDER — OXYCODONE HYDROCHLORIDE 5 MG/1
5 TABLET ORAL EVERY 6 HOURS
Refills: 0 | Status: DISCONTINUED | OUTPATIENT
Start: 2023-10-26 | End: 2023-10-26

## 2023-10-26 RX ORDER — KETAMINE HYDROCHLORIDE 100 MG/ML
50 INJECTION INTRAMUSCULAR; INTRAVENOUS ONCE
Refills: 0 | Status: DISCONTINUED | OUTPATIENT
Start: 2023-10-26 | End: 2023-10-26

## 2023-10-26 RX ORDER — ROSUVASTATIN CALCIUM 5 MG/1
1 TABLET ORAL
Refills: 0 | DISCHARGE

## 2023-10-26 RX ADMIN — SODIUM CHLORIDE 1000 MILLILITER(S): 9 INJECTION INTRAMUSCULAR; INTRAVENOUS; SUBCUTANEOUS at 00:33

## 2023-10-26 RX ADMIN — OXYCODONE HYDROCHLORIDE 5 MILLIGRAM(S): 5 TABLET ORAL at 11:26

## 2023-10-26 RX ADMIN — SODIUM CHLORIDE 500 MILLILITER(S): 9 INJECTION INTRAMUSCULAR; INTRAVENOUS; SUBCUTANEOUS at 02:39

## 2023-10-26 RX ADMIN — AMLODIPINE BESYLATE 2.5 MILLIGRAM(S): 2.5 TABLET ORAL at 05:23

## 2023-10-26 RX ADMIN — OXYCODONE HYDROCHLORIDE 5 MILLIGRAM(S): 5 TABLET ORAL at 12:26

## 2023-10-26 RX ADMIN — Medication 650 MILLIGRAM(S): at 10:58

## 2023-10-26 RX ADMIN — SODIUM CHLORIDE 1000 MILLILITER(S): 9 INJECTION INTRAMUSCULAR; INTRAVENOUS; SUBCUTANEOUS at 02:39

## 2023-10-26 RX ADMIN — MORPHINE SULFATE 2 MILLIGRAM(S): 50 CAPSULE, EXTENDED RELEASE ORAL at 02:00

## 2023-10-26 RX ADMIN — Medication 25 MILLIGRAM(S): at 05:23

## 2023-10-26 RX ADMIN — Medication 650 MILLIGRAM(S): at 10:28

## 2023-10-26 RX ADMIN — SODIUM CHLORIDE 70 MILLILITER(S): 9 INJECTION, SOLUTION INTRAVENOUS at 03:45

## 2023-10-26 RX ADMIN — PROPOFOL 50 MILLIGRAM(S): 10 INJECTION, EMULSION INTRAVENOUS at 00:34

## 2023-10-26 RX ADMIN — SODIUM CHLORIDE 70 MILLILITER(S): 9 INJECTION, SOLUTION INTRAVENOUS at 08:38

## 2023-10-26 NOTE — H&P ADULT - NSHPREVIEWOFSYSTEMS_GEN_ALL_CORE
Pt denies Chest pain, SOB, dyspnea, paresthesias, N/V/D, abdominal pain, syncope, or pain anywhere else.

## 2023-10-26 NOTE — H&P ADULT - NSHPPHYSICALEXAM_GEN_ALL_CORE
General: AAOx3. Nad  HEENT: PERRLA, EOMI  Neck: No JVD, neck supple, Trachea midline  CVS: S1/S1 present, RRR, no MRG  Pulm: CTA b/l. No WRR  Abd: +BS, soft, ND NT  Ext: 2+ pulses. No edema. good cap refill SILT  Neuro: no focal neurodeficits. CNII-XII intact  MSK:   rt shoulder: squared off appearance, skin intactm dec ROM 2dary to pain/dislocation  NVI SILT, FROM of the rt elbow, rt wrist, rt fingers compartments soft, sling intact

## 2023-10-26 NOTE — ED ADULT NURSE REASSESSMENT NOTE - NS ED NURSE REASSESS COMMENT FT1
Pt resting in bed, calm, conscious sedation to begin shortly, MD Villanueva at bedside. No acute distress noted, denies chest pain no SOB noted.

## 2023-10-26 NOTE — CONSULT NOTE ADULT - SUBJECTIVE AND OBJECTIVE BOX
PGY-3 Internal Medicine Consult Note discussed with attending    PLEASE CONTACT ON CALL TEAM:  - On Call Team (Please refer to Brent) FROM 5:00 PM - 8:30PM  - Nightfloat Team FROM 8:30 -7:30 AM    CHIEF COMPLAINT & BRIEF HOSPITAL COURSE:    INTERVAL HPI/OVERNIGHT EVENTS:       REVIEW OF SYSTEMS:  CONSTITUTIONAL: No fever, weight loss, or fatigue  RESPIRATORY: No cough, wheezing, chills or hemoptysis; No shortness of breath  CARDIOVASCULAR: No chest pain, palpitations, dizziness, or leg swelling  GASTROINTESTINAL: No abdominal pain. No nausea, vomiting, or hematemesis; No diarrhea or constipation. No melena or hematochezia.  GENITOURINARY: No dysuria or hematuria, urinary frequency  NEUROLOGICAL: No headaches, memory loss, loss of strength, numbness, or tremors  SKIN: No itching, burning, rashes, or lesions     Vital Signs Last 24 Hrs  T(C): 36.6 (25 Oct 2023 19:01), Max: 36.6 (25 Oct 2023 19:01)  T(F): 97.9 (25 Oct 2023 19:01), Max: 97.9 (25 Oct 2023 19:01)  HR: 89 (26 Oct 2023 01:26) (69 - 89)  BP: 146/70 (26 Oct 2023 01:26) (117/74 - 177/81)  BP(mean): --  RR: 16 (26 Oct 2023 01:26) (14 - 19)  SpO2: 100% (26 Oct 2023 01:26) (96% - 100%)    Parameters below as of 26 Oct 2023 01:26  Patient On (Oxygen Delivery Method): room air        PHYSICAL EXAMINATION:  GENERAL: NAD, well built  HEAD:  Atraumatic, Normocephalic  EYES:  conjunctiva and sclera clear  NECK: Supple, No JVD, Normal thyroid  CHEST/LUNG: Clear to auscultation. Clear to percussion bilaterally; No rales, rhonchi, wheezing, or rubs  HEART: Regular rate and rhythm; No murmurs, rubs, or gallops  ABDOMEN: Soft, Nontender, Nondistended; Bowel sounds present  NERVOUS SYSTEM:  Alert & Oriented X3,    EXTREMITIES:  2+ Peripheral Pulses, No clubbing, cyanosis, or edema  SKIN: warm dry                          11.1   15.84 )-----------( 284      ( 25 Oct 2023 21:47 )             34.7     10-25    134<L>  |  101  |  23<H>  ----------------------------<  130<H>  4.3   |  28  |  1.04    Ca    9.7      25 Oct 2023 21:47    TPro  7.7  /  Alb  3.9  /  TBili  0.4  /  DBili  x   /  AST  30  /  ALT  37  /  AlkPhos  70  10-25    LIVER FUNCTIONS - ( 25 Oct 2023 21:47 )  Alb: 3.9 g/dL / Pro: 7.7 g/dL / ALK PHOS: 70 U/L / ALT: 37 U/L DA / AST: 30 U/L / GGT: x               PT/INR - ( 25 Oct 2023 21:47 )   PT: 11.7 sec;   INR: 1.03 ratio         PTT - ( 25 Oct 2023 21:47 )  PTT:31.0 sec    CAPILLARY BLOOD GLUCOSE      RADIOLOGY & ADDITIONAL TESTS:                   PGY-3 Internal Medicine Consult Note discussed with attending    PLEASE CONTACT ON CALL TEAM:  - On Call Team (Please refer to Brent) FROM 5:00 PM - 8:30PM  - Nightfloat Team FROM 8:30 -7:30 AM    CHIEF COMPLAINT & BRIEF HOSPITAL COURSE: Patient is an 77 yo female with hx of Giant cell arteritis (not on any medications), HTN (on amlodipine and metoprolol), HLD, ?parathyroid disorder presents with a mechanical fall. Patient states that she was gardening earlier today and when she was going to her house tripped and fell onto her right shoulder. Patient denies syncope or head trauma. Denies prodromal symptoms, dizziness, visual disturbances etc. Denies abdominal pain. Denies chest pain. Patient denies any prior episode of fall. Patient states she has aching pain near her right shoulder, 6/10 in severity with no alleviating or aggravating factors. Patient does report diminished sensation in her right arm. Upon evaluation in ED, patient noted to be afebrile and hemodynamically stable, saturating 100% on room air. X rays of shoulder showing an anterior right shoulder dislocation. ED attending failed with manual shoulder reduction. Ortho consulted and recommended OR intervention for closed reduction. Medicine consulted for pre operative risk stratification and optimization.     INTERVAL HPI/ OVERNIGHT EVENTS: Patient denies any cardiac history. Patient reports following with Dr. Roberto Blair (cardiologist), reports having negative stress test 5 yrs ago. Patient reports having dyspnea on exertion. Reports duration of dyspnea for the last 2-3 months. Patient denies cough, LE edema or PND. Denies fevers or chills. Patient does report that she used to walk 5-6 blocks, but now walks 2 blocks at a time. Patient reports being able to climb 2 flights of stairs without stopping. Recently, her cardiolgist recommended a holter monitor for evaluation for dyspnea. Patient awaiting results. Patient also reports having high calcium level, and was told she had a parathyroid disorder. Patient has labs done by PCP on 10/20/23 at bedside. Albumin noted to be 4.5 Ca of 10.4. HbA1C noted to be 5.8%, Normal lipid panel.       REVIEW OF SYSTEMS:  CONSTITUTIONAL: No fever, weight loss, or fatigue  RESPIRATORY: No cough, wheezing, chills or hemoptysis; Has shortness of breath on exertion   CARDIOVASCULAR: No chest pain, palpitations, dizziness, or leg swelling  GASTROINTESTINAL: No abdominal pain. No nausea, vomiting, or hematemesis; No diarrhea or constipation. No melena or hematochezia.  GENITOURINARY: No dysuria or hematuria, urinary frequency  NEUROLOGICAL: No headaches, memory loss, loss of strength, numbness, or tremors  SKIN: No itching, burning, rashes, or lesions     Vital Signs Last 24 Hrs  T(C): 36.6 (25 Oct 2023 19:01), Max: 36.6 (25 Oct 2023 19:01)  T(F): 97.9 (25 Oct 2023 19:01), Max: 97.9 (25 Oct 2023 19:01)  HR: 89 (26 Oct 2023 01:26) (69 - 89)  BP: 146/70 (26 Oct 2023 01:26) (117/74 - 177/81)  BP(mean): --  RR: 16 (26 Oct 2023 01:26) (14 - 19)  SpO2: 100% (26 Oct 2023 01:26) (96% - 100%)    Parameters below as of 26 Oct 2023 01:26  Patient On (Oxygen Delivery Method): room air        PHYSICAL EXAMINATION:  GENERAL: NAD, well built, saturating 100% on 2L NC   HEAD:  Atraumatic, Normocephalic  EYES:  conjunctiva and sclera clear  NECK: Supple  CHEST/LUNG: Clear to auscultation. No rales, rhonchi, wheezing, or rubs  HEART: Regular rate and rhythm; Grade II/VI systolic murmur noted in Aortic region   ABDOMEN: Soft, Nontender, Nondistended; Bowel sounds present  NERVOUS SYSTEM:  Alert & Oriented X3,  5/5 strength in bilateral lower extremities 5/5 strength in LUE and 2/5 strength in RUE. Diminished sensation on RUE with 2+ pulses on both upper extremities   EXTREMITIES:  2+ Peripheral Pulses, No clubbing, cyanosis, or edema  SKIN: warm dry, b/l knee abrasions                           11.1   15.84 )-----------( 284      ( 25 Oct 2023 21:47 )             34.7     10-25    134<L>  |  101  |  23<H>  ----------------------------<  130<H>  4.3   |  28  |  1.04    Ca    9.7      25 Oct 2023 21:47    TPro  7.7  /  Alb  3.9  /  TBili  0.4  /  DBili  x   /  AST  30  /  ALT  37  /  AlkPhos  70  10-25    LIVER FUNCTIONS - ( 25 Oct 2023 21:47 )  Alb: 3.9 g/dL / Pro: 7.7 g/dL / ALK PHOS: 70 U/L / ALT: 37 U/L DA / AST: 30 U/L / GGT: x               PT/INR - ( 25 Oct 2023 21:47 )   PT: 11.7 sec;   INR: 1.03 ratio         PTT - ( 25 Oct 2023 21:47 )  PTT:31.0 sec    CAPILLARY BLOOD GLUCOSE      RADIOLOGY & ADDITIONAL TESTS:

## 2023-10-26 NOTE — CONSULT NOTE ADULT - PROBLEM SELECTOR RECOMMENDATION 2
- patient denies any history of murmur  - found to have Grade II/VI systolic murmur in aortic region   - euvolemic on exam   - f/u ekg  - prior to surgical intervention please obtain echocardiogram and consider cardiology consult

## 2023-10-26 NOTE — DISCHARGE NOTE PROVIDER - NPI NUMBER (FOR SYSADMIN USE ONLY) :
[[id number]] Orientation to room/Bed in low position, brakes on/Side rails x 2 or 4 up, assess large gaps, such that a patient could get extremity or other body part entrapped, use additional safety procedures/Use of non-skid footwear for ambulating patients, use of appropriate size clothing to prevent risk of tripping/Call light is within reach, educate patient/family on its functionality/Assess for adequate lighting, leave nightlight on

## 2023-10-26 NOTE — BRIEF OPERATIVE NOTE - NSICDXBRIEFPROCEDURE_GEN_ALL_CORE_FT
PROCEDURES:  Closed reduction of fracture dislocation of right shoulder 26-Oct-2023 10:34:46  Ye Zarate

## 2023-10-26 NOTE — CONSULT NOTE ADULT - ATTENDING COMMENTS
IMAGING  Chest X-Ray  Pending official read; on my read, no infiltrate, consolidation, or effusion noted    HPI  78 year old female patient with pmhx HTN, HLD, parathyroidism, giant cell arteritis who presented to the ER due to mechanical slip and fall and found to have a Fracture of Greater Tuberosity with Dislocation of Right Shoulder. Medicine consulted for pre-op optimization for surgery.  Patient denies history of heart problems including no CHF, no CAD, no arrhythmias.  Patient reports she was referred to Pulmonology for her chronic shortness of breath; but otherwise no chronic pulmonary problems  Patient denies history of liver problems  Patient reports history of giant cell arteritis for which she completed steroid course in June and has been having no further problems with her GCA; otherwise, denies history of vascular problems; denies any aneurysms  Patient denies history of thyroid problems; only endorses parathyroidism  Patient states she was told to follow up for her parathyroidism in 8 months recently; Serum Calcium currently WNL on blood work at 9.7 with Albumin of 3.9  Patient reports worsening shortness of breath for which she saw Cardiology and had an ECHO 5 months ago; patient was told to see Pulmonology for her worsening shortness of breath; however, patient is able to tolerate walking 2 to 3 blocks before getting short of breath  Loud +3/6 systolic murmur on exam  Patient states she had a stress test for her heart years ago that was negative  No signs of infection; no fever/chills, no urinary symptoms, CXR negative. Leukocytosis likely reactive 2/2 pain.    Review Of Systems included: + shortness of breath on exertion, + chronic dry cough, + partial numbness of right arm, + mechanical slip and fall  no shortness of breath, no chest pain, no chest pain on exertion, no fever/chills, no urinary symptoms, no yellow/green sputum production, no focal weakness, no diarrhea, no constipation, no loss of consciousness    Physical Exam  General: Awake, Alert, Oriented  Cardiac: RRR, +3/6 left upper sternal systolic murmur  Pulmonary: CTA b/l  Abdominal: Soft, ND, NT  Extremities: No edema, pulses present in right hand, partial numbness of entire right arm    A/P  # Fracture of Greater Tuberosity with Dislocation of Right Shoulder  Patient able to do > 4 METs  RCRI Class I Risk (3.9 % 30-day risk of death, MI, or cardiac arrest)  ASA Score of 2  No signs of infection; no fever/chills, no urinary symptoms, CXR negative. Leukocytosis likely reactive 2/2 pain.  - Can do a pre-op EKG  - Would recommend an ECHO before surgery to evaluate for valvular disorders given patient's shortness of breath on exertion and loud +3/6 systolic murmur  - Consider further evaluation for her shortness of breath on exertion before surgery; although, patient is able to tolerate 2 to 3 blocks before getting short of breath and has > 4 METs. If ECHO shows moderate to severe valvular disorder, her shortness of breath may be Cardiac in origin and she could benefit from a Cardiology consult. If ECHO is negative, she may benefit from a Pulmonology consult.    # Reactive Leukocytosis 2/2 pain  No signs of infection; no fever/chills, no urinary symptoms, CXR negative. Leukocytosis likely reactive 2/2 pain.  - Monitor off antibiotics  - Tylenol prn for pain    # Hx of Parathyroidism - Stable  Patient states she was told to follow up for her parathyroidism in 8 months recently; Serum Calcium currently WNL on blood work at 9.7 with Albumin of 3.9    # Hx of Giant Cell Arteritis - Stable  Patient reports history of giant cell arteritis for which she completed steroid course in June and has been having no further problems with her GCA    # Hx of HTN, HLD  - Continue home medications metoprolol, amlodipine, statin    # FEN  - Monitor and replete electrolytes as needed  - Gentle IV Fluid Hydration    I did examine all labs and imaging including: CBC, PT/INR, CMP, CXR IMAGING  Chest X-Ray  Pending official read; on my read, no infiltrate, consolidation, or effusion noted    HPI  78 year old female patient with pmhx HTN, HLD, parathyroid disorder, giant cell arteritis who presented to the ER due to mechanical slip and fall and found to have a Fracture of Greater Tuberosity with Dislocation of Right Shoulder. Medicine consulted for pre-op optimization for surgery.  Patient denies history of heart problems including no CHF, no CAD, no arrhythmias.  Patient reports she was referred to Pulmonology for her chronic shortness of breath; but otherwise no chronic pulmonary problems  Patient denies history of liver problems  Patient reports history of giant cell arteritis for which she completed steroid course in June and has been having no further problems with her GCA; otherwise, denies history of vascular problems; denies any aneurysms  Patient denies history of thyroid problems; only endorses parathyroid disorder  Patient states she was recently told to follow up for her parathyroid disorder in 8 months; Serum Calcium currently WNL on blood work at 9.7 with Albumin of 3.9  Patient reports worsening shortness of breath for which she saw Cardiology and had an ECHO 5 months ago; patient was told to see Pulmonology for her worsening shortness of breath; however, patient is able to tolerate walking 2 to 3 blocks before getting short of breath  Loud +3/6 systolic murmur on exam  Patient states she had a stress test for her heart years ago that was negative  No signs of infection; no fever/chills, no urinary symptoms, CXR negative. Leukocytosis likely reactive 2/2 pain.    Review Of Systems included: + shortness of breath on exertion, + chronic dry cough, + partial numbness of right arm, + mechanical slip and fall  no shortness of breath, no chest pain, no chest pain on exertion, no fever/chills, no urinary symptoms, no yellow/green sputum production, no focal weakness, no diarrhea, no constipation, no loss of consciousness    Physical Exam  General: Awake, Alert, Oriented  Cardiac: RRR, +3/6 left upper sternal systolic murmur  Pulmonary: CTA b/l  Abdominal: Soft, ND, NT  Extremities: No edema, pulses present in right hand, partial numbness of entire right arm    A/P  # Fracture of Greater Tuberosity with Dislocation of Right Shoulder  Patient able to do > 4 METs  RCRI Class I Risk (3.9 % 30-day risk of death, MI, or cardiac arrest)  ASA Score of 2  No signs of infection; no fever/chills, no urinary symptoms, CXR negative. Leukocytosis likely reactive 2/2 pain.  - Can do a pre-op EKG  - Would recommend an ECHO before surgery to evaluate for valvular disorders given patient's shortness of breath on exertion and loud +3/6 systolic murmur  - Consider further evaluation for her shortness of breath on exertion before surgery; although, patient is able to tolerate 2 to 3 blocks before getting short of breath and has > 4 METs. If ECHO shows moderate to severe valvular disorder, her shortness of breath may be Cardiac in origin and she could benefit from a Cardiology consult. If ECHO is negative, she may benefit from a Pulmonology consult.    # Reactive Leukocytosis 2/2 pain  No signs of infection; no fever/chills, no urinary symptoms, CXR negative. Leukocytosis likely reactive 2/2 pain.  - Monitor off antibiotics  - Tylenol prn for pain    # Hx of Parathyroid disorder - Stable  Patient states she was recently told to follow up for her parathyroid disorder in 8 months; Serum Calcium currently WNL on blood work at 9.7 with Albumin of 3.9    # Hx of Giant Cell Arteritis - Stable  Patient reports history of giant cell arteritis for which she completed steroid course in June and has been having no further problems with her GCA    # Hx of HTN, HLD  - Continue home medications metoprolol, amlodipine, statin    # FEN  - Monitor and replete electrolytes as needed  - Gentle IV Fluid Hydration    I did examine all labs and imaging including: CBC, PT/INR, CMP, CXR IMAGING  Chest X-Ray  Pending official read; on my read, no infiltrate, consolidation, or effusion noted    HPI  78 year old female patient with pmhx HTN, HLD, parathyroid disorder, giant cell arteritis who presented to the ER due to mechanical slip and fall and found to have a Fracture of Greater Tuberosity with Dislocation of Right Shoulder. Medicine consulted for pre-op optimization for surgery.  Patient denies history of heart problems including no CHF, no CAD, no arrhythmias.  Patient reports she was referred to Pulmonology for her chronic shortness of breath; but otherwise no chronic pulmonary problems. Able to do > 4 METs.  Patient denies history of liver problems  Patient reports history of giant cell arteritis for which she completed steroid course in June and has been having no further problems with her GCA; otherwise, denies history of vascular problems; denies any aneurysms  Patient denies history of thyroid problems; only endorses parathyroid disorder  Patient states she was recently told to follow up for her parathyroid disorder in 8 months; Serum Calcium currently WNL on blood work at 9.7 with Albumin of 3.9  Patient reports worsening shortness of breath for which she saw Cardiology and had an ECHO 5 months ago; patient was told to see Pulmonology for her worsening shortness of breath; however, patient is able to tolerate walking 2 to 3 blocks before getting short of breath  Loud +3/6 systolic murmur on exam  Patient states she had a stress test for her heart years ago that was negative  No signs of infection; no fever/chills, no urinary symptoms, CXR negative. Leukocytosis likely reactive 2/2 pain.    Review Of Systems included: + shortness of breath on exertion, + chronic dry cough, + partial numbness of right arm, + mechanical slip and fall  no shortness of breath, no chest pain, no chest pain on exertion, no fever/chills, no urinary symptoms, no yellow/green sputum production, no focal weakness, no diarrhea, no constipation, no loss of consciousness    Physical Exam  General: Awake, Alert, Oriented  Cardiac: RRR, +3/6 left upper sternal systolic murmur  Pulmonary: CTA b/l  Abdominal: Soft, ND, NT  Extremities: No edema, pulses present in right hand, partial numbness of entire right arm    A/P  # Fracture of Greater Tuberosity with Dislocation of Right Shoulder  Patient able to do > 4 METs  RCRI Class I Risk (3.9 % 30-day risk of death, MI, or cardiac arrest)  ASA Score of 2  No signs of infection; no fever/chills, no urinary symptoms, CXR negative. Leukocytosis likely reactive 2/2 pain.  - Patient medically optimized for closed reduction surgery. Patient can proceed with closed reduction surgery.    # Reactive Leukocytosis 2/2 pain  No signs of infection; no fever/chills, no urinary symptoms, CXR negative. Leukocytosis likely reactive 2/2 pain.  - Monitor off antibiotics  - Tylenol prn for pain    # Hx of Parathyroid disorder - Stable  Patient states she was recently told to follow up for her parathyroid disorder in 8 months; Serum Calcium currently WNL on blood work at 9.7 with Albumin of 3.9    # Hx of Giant Cell Arteritis - Stable  Patient reports history of giant cell arteritis for which she completed steroid course in June and has been having no further problems with her GCA    # Hx of HTN, HLD  - Continue home medications metoprolol, amlodipine, statin    # FEN  - Monitor and replete electrolytes as needed  - Gentle IV Fluid Hydration    I did examine all labs and imaging including: CBC, PT/INR, CMP, CXR IMAGING  Chest X-Ray  Pending official read; on my read, no infiltrate, consolidation, or effusion noted    HPI  78 year old female patient with pmhx HTN, HLD, parathyroid disorder, giant cell arteritis who presented to the ER due to mechanical slip and fall and found to have a Fracture of Greater Tuberosity with Dislocation of Right Shoulder. Medicine consulted for pre-op optimization for surgery.  Patient denies history of heart problems including no CHF, no CAD, no arrhythmias.  Patient reports she was referred to Pulmonology for her chronic shortness of breath; but otherwise no chronic pulmonary problems. Able to do > 4 METs.  Patient denies history of liver problems  Patient reports history of giant cell arteritis for which she completed steroid course in June and has been having no further problems with her GCA; otherwise, denies history of vascular problems; denies any aneurysms  Patient denies history of thyroid problems; only endorses parathyroid disorder  Patient states she was recently told to follow up for her parathyroid disorder in 8 months; Serum Calcium currently WNL on blood work at 9.7 with Albumin of 3.9  Patient reports worsening shortness of breath for which she saw Cardiology and had an ECHO 5 months ago; patient was told to see Pulmonology for her worsening shortness of breath; however, patient is able to tolerate walking 2 to 3 blocks before getting short of breath, able to do > 4 METs  Patient states she had a stress test for her heart years ago that was negative  No signs of infection; no fever/chills, no urinary symptoms, CXR negative. Leukocytosis likely reactive 2/2 pain.    Review Of Systems included: + shortness of breath on exertion, + chronic dry cough, + partial numbness of right arm, + mechanical slip and fall  no shortness of breath, no chest pain, no chest pain on exertion, no fever/chills, no urinary symptoms, no yellow/green sputum production, no focal weakness, no diarrhea, no constipation, no loss of consciousness    Physical Exam  General: Awake, Alert, Oriented  Cardiac: RRR, +2/6 left upper sternal systolic murmur  Pulmonary: CTA b/l  Abdominal: Soft, ND, NT  Extremities: No edema, pulses present in right hand, partial numbness of entire right arm    A/P  # Fracture of Greater Tuberosity with Dislocation of Right Shoulder  Patient able to do > 4 METs  RCRI Class I Risk (3.9 % 30-day risk of death, MI, or cardiac arrest)  ASA Score of 2  No signs of infection; no fever/chills, no urinary symptoms, CXR negative. Leukocytosis likely reactive 2/2 pain.  - Patient medically optimized for closed reduction surgery. Patient can proceed with closed reduction surgery.    # Reactive Leukocytosis 2/2 pain  No signs of infection; no fever/chills, no urinary symptoms, CXR negative. Leukocytosis likely reactive 2/2 pain.  - Monitor off antibiotics  - Tylenol prn for pain    # Hx of Parathyroid disorder - Stable  Patient states she was recently told to follow up for her parathyroid disorder in 8 months; Serum Calcium currently WNL on blood work at 9.7 with Albumin of 3.9    # Hx of Giant Cell Arteritis - Stable  Patient reports history of giant cell arteritis for which she completed steroid course in June and has been having no further problems with her GCA    # Hx of HTN, HLD  - Continue home medications metoprolol, amlodipine, statin    # FEN  - Monitor and replete electrolytes as needed  - Gentle IV Fluid Hydration    I did examine all labs and imaging including: CBC, PT/INR, CMP, CXR

## 2023-10-26 NOTE — H&P ADULT - HISTORY OF PRESENT ILLNESS
79 yo f, s/p mechanical slip and fall with injury to right shoulder & chest wall.  Subsequently dislocated her rt shoulder. C/o decreased ROM. No head/neck trauma, no LOC, no blood thinner use.  Patient usual state of health prior, no other injuries or complaints.

## 2023-10-26 NOTE — H&P ADULT - NS ATTEND AMEND GEN_ALL_CORE FT
78F with right shoulder fracture-dislocation (greater tuberosity) s/p mechanical fall    AOx3  pvt home  R shoulder motion limited with ecchymosis  HH palpable in anterior shoulder  Painless ROM of elbow/wrist  Superficial abrasion to b/l patella  Remainder of EXT WNL with painless near FROM   NVI  RBA discussed   Reports low demand and not interested in open surgery (now or later)  Unsuccessful ED reduction attempt  to OR for closed reduction, pending OR availability 10/26/23 78F with right shoulder fracture-dislocation (greater tuberosity) s/p mechanical fall    AOx3  pvt home  R shoulder motion limited with ecchymosis  HH palpable in anterior shoulder  Painless ROM of elbow/wrist  Superficial abrasion to b/l patella - declined further imaging  Remainder of EXT WNL with painless near FROM   NVI  RBA discussed   Reports low demand and not interested in open surgery (now or later)  Unsuccessful ED reduction attempt  to OR for closed reduction, pending OR availability 10/26/23

## 2023-10-26 NOTE — DISCHARGE NOTE PROVIDER - NSDCCPCAREPLAN_GEN_ALL_CORE_FT
PRINCIPAL DISCHARGE DIAGNOSIS  Diagnosis: Dislocation of right shoulder joint  Assessment and Plan of Treatment: Pain Management- See Attached Medication Reconciliation  Weight Bearing Status: NWB to Right shoulder with sling   PT/Occupational Therapy are Activities of Daily Living as appropriate  Patient is to follow up with Orthopedic Surgeon, Dr. Wade in office at: [phone]      SECONDARY DISCHARGE DIAGNOSES  Diagnosis: Fracture of humerus greater tuberosity  Assessment and Plan of Treatment: Pain Management- See Attached Medication Reconciliation  Weight Bearing Status: NWB to Right shoulder with sling   PT/Occupational Therapy are Activities of Daily Living as appropriate  Patient is to follow up with Orthopedic Surgeon, Dr. Wade in office at: [phone]

## 2023-10-26 NOTE — H&P ADULT - ASSESSMENT
79 yo f with rt glenohumeral dislocation    - Admit to Ortho- Warren HIGGINS  - Condition: Stable  - Transfer to Floor  - Pre-op for closed reduction rt shoulder 10/26/23 with sedation in OR suite  - Procedure: RT SHOULDER CLOSED REDUCTION  - Surgeon: WARREN FRAZIER  - Clearance: n/a  - Consent: in chart  - Medical Consult called for monitoring  - Keep NPO today  - IVF when NPO  - Hold Anticoagulants today  - Keep affected extremity in sling  - Pain Management  - DVT ppx with Venodynes    - Case Discussed with SLAVA JAMISONPTA< WARREN

## 2023-10-26 NOTE — CONSULT NOTE ADULT - PROBLEM SELECTOR RECOMMENDATION 3
- noted to have Wbc of 15  - can be 2/2 reactive due to fall  - monitor off antibiotics   - monitor cbc

## 2023-10-26 NOTE — DISCHARGE NOTE PROVIDER - NSDCCPTREATMENT_GEN_ALL_CORE_FT
PRINCIPAL PROCEDURE  Procedure: Closed reduction of fracture dislocation of right shoulder  Findings and Treatment: Pain Management- See Attached Medication Reconciliation  Weight Bearing Status: NWB to Right shoulder with sling   PT/Occupational Therapy are Activities of Daily Living as appropriate  Patient is to follow up with Orthopedic Surgeon, Dr. Wade in office at: [phone]

## 2023-10-26 NOTE — DISCHARGE NOTE NURSING/CASE MANAGEMENT/SOCIAL WORK - NSDCPEPT PROEDMA_GEN_ALL_CORE
Patient presented to clinic with dyspnea, weight gain, stopping diuretics. Pale in color. Extremities warm to touch. Confusion. Daughter Toma present. 02 sats 715 on RA. Dr. Spencer updated immediately. Recommended ED evaluation. Visit cancelled. Dr. Spencer called ED to update.    Yes

## 2023-10-26 NOTE — CONSULT NOTE ADULT - ASSESSMENT
Patient is an 79 yo female with hx of Giant cell arteritis (not on any medications), HTN (on amlodipine and metoprolol), HLD, ?parathyroid disorder presents with a mechanical fall. Patient states that she was gardening earlier today and when she was going to her house tripped and fell onto her right shoulder.Upon evaluation in ED, patient noted to be afebrile and hemodynamically stable, saturating 100% on room air. Patient noted to be euvolemic on examination, diminished strength and sensation noted on RUE.  Labs significant for leukocytosis of 15, hgb 11.1 normal renal function. X rays of shoulder showing an anterior right shoulder dislocation. CXR showing no gross infiltrate of effusion. EKG pending  ED attending failed with manual shoulder reduction. Ortho consulted and recommended OR intervention for closed reduction. Medicine consulted for pre operative risk stratification and optimization.

## 2023-10-26 NOTE — PATIENT PROFILE ADULT - FALL HARM RISK - HARM RISK INTERVENTIONS
Assistance with ambulation/Assistance OOB with selected safe patient handling equipment/Communicate Risk of Fall with Harm to all staff/Discuss with provider need for PT consult/Monitor gait and stability/Reinforce activity limits and safety measures with patient and family/Tailored Fall Risk Interventions/Visual Cue: Yellow wristband and red socks/Bed in lowest position, wheels locked, appropriate side rails in place/Call bell, personal items and telephone in reach/Instruct patient to call for assistance before getting out of bed or chair/Non-slip footwear when patient is out of bed/[place] to call system/Physically safe environment - no spills, clutter or unnecessary equipment/Purposeful Proactive Rounding/Room/bathroom lighting operational, light cord in reach

## 2023-10-26 NOTE — DISCHARGE NOTE NURSING/CASE MANAGEMENT/SOCIAL WORK - PATIENT PORTAL LINK FT
You can access the FollowMyHealth Patient Portal offered by [facility] by registering at the following website: [email or web address]. By joining [employer]’s FollowMyHealth portal, you will also be able to view your health information using other applications (apps) compatible with our system.

## 2023-10-26 NOTE — DISCHARGE NOTE PROVIDER - HOSPITAL COURSE
Patient is a 77 y/o F underwent a closed reduction of her right shoulder anterior dislocation in the OR after multiple failed attempts in the ED. Patient with no acute post op complications.

## 2023-10-26 NOTE — DISCHARGE NOTE PROVIDER - CARE PROVIDER_API CALL
Abdifatah Wade   Orthopaedic Surgery  [address] [place] [id number]  Phone: [phone]  Fax: [phone]  Follow Up Time: 1 week

## 2024-02-29 PROBLEM — I10 ESSENTIAL (PRIMARY) HYPERTENSION: Chronic | Status: ACTIVE | Noted: 2023-10-25

## 2024-02-29 PROBLEM — E78.5 HYPERLIPIDEMIA, UNSPECIFIED: Chronic | Status: ACTIVE | Noted: 2023-10-25

## 2024-03-26 ENCOUNTER — APPOINTMENT (OUTPATIENT)
Dept: ORTHOPEDIC SURGERY | Facility: CLINIC | Age: 79
End: 2024-03-26

## 2024-08-30 NOTE — PATIENT PROFILE ADULT - ARE SIGNIFICANT INDICATORS COMPLETE.
Protocol Failed/ No Protocol    Requested Prescriptions   Pending Prescriptions Disp Refills    CYCLOBENZAPRINE 5 MG Oral Tab [Pharmacy Med Name: CYCLOBENZAPRINE 5 MG TABLET] 30 tablet 0     Sig: TAKE 1 TABLET BY MOUTH EVERY DAY AT NIGHT       There is no refill protocol information for this order          Future Appointments         Provider Department Appt Notes    In 1 month Leela Baez MD [facility] Referral due to chronic pain          Recent Outpatient Visits              1 month ago Routine physical examination    [facility], Mary Grace Caicedo MD    Office Visit    9 months ago Well woman exam    [facility] - OB/GYN Taz Olsen,     Office Visit    1 year ago Routine physical examination    [facility], Mary Grace Caicedo MD    Office Visit    1 year ago [facility] woman exam    [facility] - OB/[place],     Office Visit    3 years ago [facility] exam    [facility] - OB/Taz Cross,     Office Visit           Yes

## 2025-06-16 ENCOUNTER — APPOINTMENT (OUTPATIENT)
Dept: ORTHOPEDIC SURGERY | Facility: CLINIC | Age: 80
End: 2025-06-16
Payer: MEDICARE

## 2025-06-16 PROCEDURE — 99204 OFFICE O/P NEW MOD 45 MIN: CPT
